# Patient Record
(demographics unavailable — no encounter records)

---

## 2025-01-23 NOTE — PHYSICAL EXAM
[No Acute Distress] : no acute distress [Well Nourished] : well nourished [Well Developed] : well developed [Well-Appearing] : well-appearing [Normal Sclera/Conjunctiva] : normal sclera/conjunctiva [PERRL] : pupils equal round and reactive to light [EOMI] : extraocular movements intact [Normal Outer Ear/Nose] : the outer ears and nose were normal in appearance [Normal Oropharynx] : the oropharynx was normal [Normal TMs] : both tympanic membranes were normal [No JVD] : no jugular venous distention [No Respiratory Distress] : no respiratory distress  [No Accessory Muscle Use] : no accessory muscle use [Clear to Auscultation] : lungs were clear to auscultation bilaterally [Normal Rate] : normal rate  [Regular Rhythm] : with a regular rhythm [Normal S1, S2] : normal S1 and S2 [No Murmur] : no murmur heard [Soft] : abdomen soft [Non Tender] : non-tender [Non-distended] : non-distended [No Masses] : no abdominal mass palpated [No HSM] : no HSM [Normal Bowel Sounds] : normal bowel sounds [No Spinal Tenderness] : no spinal tenderness [No Joint Swelling] : no joint swelling [Grossly Normal Strength/Tone] : grossly normal strength/tone [No Rash] : no rash [Coordination Grossly Intact] : coordination grossly intact [No Focal Deficits] : no focal deficits [Normal Gait] : normal gait [Normal Affect] : the affect was normal [Normal Insight/Judgement] : insight and judgment were intact

## 2025-01-23 NOTE — ASSESSMENT
[FreeTextEntry1] : 21M with a past medical history of childhood asthma and depression presenting for a comprehensive physical exam

## 2025-01-23 NOTE — HISTORY OF PRESENT ILLNESS
[FreeTextEntry1] : presenting for annual physical  [de-identified] : 21M with a past medical history of childhood asthma and depression presenting for a comprehensive physical exam for a new job as an  beginning in March.

## 2025-01-23 NOTE — HEALTH RISK ASSESSMENT
[2 - 4 times a month (2 pts)] : 2-4 times a month (2 points) [1 or 2 (0 pts)] : 1 or 2 (0 points) [Never (0 pts)] : Never (0 points) [No falls in past year] : Patient reported no falls in the past year [0] : 2) Feeling down, depressed, or hopeless: Not at all (0) [Never] : Never [With Family] : lives with family [Employed] : employed [College] : College [Feels Safe at Home] : Feels safe at home [Fully functional (bathing, dressing, toileting, transferring, walking, feeding)] : Fully functional (bathing, dressing, toileting, transferring, walking, feeding) [Fully functional (using the telephone, shopping, preparing meals, housekeeping, doing laundry, using] : Fully functional and needs no help or supervision to perform IADLs (using the telephone, shopping, preparing meals, housekeeping, doing laundry, using transportation, managing medications and managing finances) [Reports normal functional visual acuity (ie: able to read med bottle)] : Reports normal functional visual acuity [Good] : ~his/her~  mood as  good [Yes] : In the past 12 months have you used drugs other than those required for medical reasons? Yes [PHQ-2 Negative - No further assessment needed] : PHQ-2 Negative - No further assessment needed [None] : None [Single] : single [Audit-CScore] : 2 [de-identified] : 1 joint of marijuana a day  [de-identified] : calisthetics  a few times a week, walks 10,000 steps a day  [de-identified] : eats primarily grains, fiber and proteins, usual meal consists of rice, beans and a source of protein  [[id number]] : 0 [Change in mental status noted] : No change in mental status noted [Sexually Active] : not sexually active [Reports changes in hearing] : Reports no changes in hearing [Reports changes in vision] : Reports no changes in vision [Reports changes in dental health] : Reports no changes in dental health [FreeTextEntry2] :   [de-identified] : [facility]

## 2025-01-23 NOTE — REVIEW OF SYSTEMS
[Negative] : Constitutional [Earache] : no earache [Hearing Loss] : no hearing loss [Chest Pain] : no chest pain [Palpitations] : no palpitations [Shortness Of Breath] : no shortness of breath [Wheezing] : no wheezing [Cough] : no cough [Dyspnea on Exertion] : not dyspnea on exertion [Abdominal Pain] : no abdominal pain [Nausea] : no nausea [Vomiting] : no vomiting [Dysuria] : no dysuria [Hematuria] : no hematuria [Joint Pain] : no joint pain [Back Pain] : no back pain [Skin Rash] : no skin rash [Headache] : no headache [Suicidal] : not suicidal [Insomnia] : no insomnia [Anxiety] : no anxiety [Depression] : no depression

## 2025-02-24 NOTE — PLAN
[FreeTextEntry1] : # Pre work assessment form - The form was filled and to be picked up by the patient. - He is currently in a good state of health and there is no medical contraindications for him to start his new job - The patient will come today to do his missing blood work as well.   Case discussed with Dr Pearson

## 2025-02-24 NOTE — END OF VISIT
[FreeTextEntry3] : I was present with the Resident during the key portions of this encounter and provided supervision via audio/visual technology.  I agree with the findings and plan as documented in the Resident's note, unless noted below. Pt is in stable physical and psychiatric health for gainful employment, working on the [employer].  [Time Spent: ___ minutes] : I have spent [unfilled] minutes of time on the encounter which excludes teaching and separately reported services.

## 2025-02-24 NOTE — PLAN
[FreeTextEntry1] : # Pre work assessment form - The form was filled and to be picked up by the patient. - He is currently in a good state of health and there is no medical contraindications for him to start his new job - The patient will come today to do his missing blood work as well.   Case discussed with Dr Peasron

## 2025-02-24 NOTE — HISTORY OF PRESENT ILLNESS
[Home] : at home, [unfilled] , at the time of the visit. [Medical Office: ([facility])___] : at the medical office located in  [Telehealth (audio & video)] : This visit was provided via telehealth using real-time 2-way audio visual technology. [Verbal consent obtained from patient] : the patient, [unfilled] [FreeTextEntry1] : fill out form  [de-identified] : A 22 year old male with PMH of remote asthma, depression, presents today through a tele health appointment to fill out a work form. 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 The patient currently denies any complaints and expresses strong desire to start working in his new job.

## 2025-02-24 NOTE — HISTORY OF PRESENT ILLNESS
[Home] : at home, [unfilled] , at the time of the visit. [Medical Office: ([facility])___] : at the medical office located in  [Telehealth (audio & video)] : This visit was provided via telehealth using real-time 2-way audio visual technology. [Verbal consent obtained from patient] : the patient, [unfilled] [FreeTextEntry1] : fill out form  [de-identified] : A 22 year old male with PMH of remote asthma, depression, presents today through a tele health appointment to fill out a work form. 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 The patient currently denies any complaints and expresses strong desire to start working in his new job.